# Patient Record
Sex: MALE | Race: BLACK OR AFRICAN AMERICAN | URBAN - METROPOLITAN AREA
[De-identification: names, ages, dates, MRNs, and addresses within clinical notes are randomized per-mention and may not be internally consistent; named-entity substitution may affect disease eponyms.]

---

## 2020-07-25 ENCOUNTER — TELEPHONE ENCOUNTER (OUTPATIENT)
Dept: URBAN - METROPOLITAN AREA CLINIC 13 | Facility: CLINIC | Age: 55
End: 2020-07-25

## 2020-07-26 ENCOUNTER — TELEPHONE ENCOUNTER (OUTPATIENT)
Dept: URBAN - METROPOLITAN AREA CLINIC 13 | Facility: CLINIC | Age: 55
End: 2020-07-26

## 2022-01-28 ENCOUNTER — TELEPHONE ENCOUNTER (OUTPATIENT)
Dept: URBAN - METROPOLITAN AREA CLINIC 72 | Facility: CLINIC | Age: 57
End: 2022-01-28

## 2022-02-09 ENCOUNTER — DASHBOARD ENCOUNTERS (OUTPATIENT)
Age: 57
End: 2022-02-09

## 2022-02-09 ENCOUNTER — OFFICE VISIT (OUTPATIENT)
Dept: URBAN - METROPOLITAN AREA CLINIC 107 | Facility: CLINIC | Age: 57
End: 2022-02-09
Payer: COMMERCIAL

## 2022-02-09 ENCOUNTER — WEB ENCOUNTER (OUTPATIENT)
Dept: URBAN - METROPOLITAN AREA CLINIC 107 | Facility: CLINIC | Age: 57
End: 2022-02-09

## 2022-02-09 VITALS
RESPIRATION RATE: 18 BRPM | BODY MASS INDEX: 35.25 KG/M2 | DIASTOLIC BLOOD PRESSURE: 100 MMHG | TEMPERATURE: 98.1 F | HEIGHT: 73 IN | HEART RATE: 81 BPM | WEIGHT: 266 LBS | SYSTOLIC BLOOD PRESSURE: 167 MMHG

## 2022-02-09 DIAGNOSIS — K92.1 HEMATOCHEZIA: ICD-10-CM

## 2022-02-09 DIAGNOSIS — D62 ACUTE BLOOD LOSS ANEMIA: ICD-10-CM

## 2022-02-09 DIAGNOSIS — Z86.010 HISTORY OF COLON POLYPS: ICD-10-CM

## 2022-02-09 PROCEDURE — 99204 OFFICE O/P NEW MOD 45 MIN: CPT | Performed by: INTERNAL MEDICINE

## 2022-02-09 PROCEDURE — 99244 OFF/OP CNSLTJ NEW/EST MOD 40: CPT | Performed by: INTERNAL MEDICINE

## 2022-02-09 RX ORDER — POLYETHYLENE GLYCOL 3350, SODIUM CHLORIDE, SODIUM BICARBONATE AND POTASSIUM CHLORIDE 420G
AS DIRECTED KIT ORAL ONCE
Qty: 420 GRAM | Refills: 0 | OUTPATIENT
Start: 2022-02-09 | End: 2022-02-10

## 2022-02-09 NOTE — HPI-TODAY'S VISIT:
56-year-old male with history of hypertension, hyperlipidemia, colon polyps, referred by Dr. Won Bey for evaluation of hematochezia.  A copy of this document is being forwarded to the referring provider. Most recent labs available for review from December 2020 show a hemoglobin of 10.3.  About two weeks ago, he felt generally unwell after arriving home from work. He reports the sudden onset of diffuse abdominal pressure and the urge to have a bowel movement, followed by the passage of a small amount of stool and a large volume of blood. His wife states he passed out during this event. She was able to get him to the bed and wrapped him in cool towels, because he was diffusely sweating. He felt severely nauseated and kept coughing up phelgm. Any time he would cough, he would pass a small amount of stool and red blood per rectum. This lasted several hours. The following day, he began having black stools. The abdominal pain subsided after a few days. He has not experienced further rectal bleeding since. He was seen by his PCP and states his hemoglobin was 8.6. This was rechecked a few days later and declined to 8.2; his hemoglobin was rechecked two days ago but he has not received the results. He has continued to feel lightheaded and dizzy, and has been unable to complete a full day of work. His wife started him on oral iron supplementation and B12. He has had two previous episodes of overt GI bleeding, in 2006 and 2019, both attributed to diverticular bleed. His last colonoscopy was over 5 years ago and notable for the removal of polyps. He also had an upper endoscopy at that time. He denies constipation at baseline.

## 2022-02-10 ENCOUNTER — TELEPHONE ENCOUNTER (OUTPATIENT)
Dept: URBAN - METROPOLITAN AREA CLINIC 107 | Facility: CLINIC | Age: 57
End: 2022-02-10

## 2022-02-10 ENCOUNTER — TELEPHONE ENCOUNTER (OUTPATIENT)
Dept: URBAN - METROPOLITAN AREA CLINIC 113 | Facility: CLINIC | Age: 57
End: 2022-02-10

## 2022-02-14 ENCOUNTER — TELEPHONE ENCOUNTER (OUTPATIENT)
Dept: URBAN - METROPOLITAN AREA CLINIC 113 | Facility: CLINIC | Age: 57
End: 2022-02-14

## 2022-04-01 ENCOUNTER — TELEPHONE ENCOUNTER (OUTPATIENT)
Dept: URBAN - METROPOLITAN AREA CLINIC 113 | Facility: CLINIC | Age: 57
End: 2022-04-01

## 2022-04-29 ENCOUNTER — OFFICE VISIT (OUTPATIENT)
Dept: URBAN - METROPOLITAN AREA SURGERY CENTER 25 | Facility: SURGERY CENTER | Age: 57
End: 2022-04-29

## 2022-11-30 ENCOUNTER — TELEPHONE ENCOUNTER (OUTPATIENT)
Dept: URBAN - METROPOLITAN AREA CLINIC 113 | Facility: CLINIC | Age: 57
End: 2022-11-30

## 2022-12-09 PROBLEM — 428283002 HISTORY OF POLYP OF COLON: Status: ACTIVE | Noted: 2022-02-09

## 2022-12-12 ENCOUNTER — OFFICE VISIT (OUTPATIENT)
Dept: URBAN - METROPOLITAN AREA SURGERY CENTER 25 | Facility: SURGERY CENTER | Age: 57
End: 2022-12-12
Payer: COMMERCIAL

## 2022-12-12 ENCOUNTER — TELEPHONE ENCOUNTER (OUTPATIENT)
Dept: URBAN - METROPOLITAN AREA CLINIC 113 | Facility: CLINIC | Age: 57
End: 2022-12-12

## 2022-12-12 DIAGNOSIS — K62.5 ANAL BLEEDING: ICD-10-CM

## 2022-12-12 DIAGNOSIS — D50.0 ANEMIA: ICD-10-CM

## 2022-12-12 PROCEDURE — 45378 DIAGNOSTIC COLONOSCOPY: CPT | Performed by: INTERNAL MEDICINE

## 2022-12-12 PROCEDURE — G8907 PT DOC NO EVENTS ON DISCHARG: HCPCS | Performed by: INTERNAL MEDICINE

## 2023-05-02 ENCOUNTER — TELEPHONE ENCOUNTER (OUTPATIENT)
Dept: URBAN - METROPOLITAN AREA CLINIC 113 | Facility: CLINIC | Age: 58
End: 2023-05-02

## 2024-07-11 ENCOUNTER — TELEPHONE ENCOUNTER (OUTPATIENT)
Dept: URBAN - METROPOLITAN AREA CLINIC 113 | Facility: CLINIC | Age: 59
End: 2024-07-11

## 2024-08-05 ENCOUNTER — OFFICE VISIT (OUTPATIENT)
Dept: URBAN - METROPOLITAN AREA CLINIC 107 | Facility: CLINIC | Age: 59
End: 2024-08-05

## 2024-08-26 ENCOUNTER — OFFICE VISIT (OUTPATIENT)
Dept: URBAN - METROPOLITAN AREA CLINIC 107 | Facility: CLINIC | Age: 59
End: 2024-08-26

## 2024-08-26 NOTE — HPI-TODAY'S VISIT:
59-year-old male with history of hypertension, hyperlipidemia, colon polyps, here for a hospital follow-up for mild diverticulitis and anemia.  Seen inpatient at [facility] from 7/8/2024 discharge 7/11/2024 with diverticulitis.  Started on IV Zosyn discharged on metronidazole and Cipro.  He did not require blood transfusion was initiated on iron therapy. CT abdomen and pelvis 7/8/2024 mild thickening and surrounding inflammatory changes of distal descending colon/proximal sigmoid colon suggestive of acute uncomplicated diverticulitis. Labs 7/11/2024.  Hemoglobin 7.7, hematocrit 24.9.  LFTs and BMP normal. Labs 7/30/2024.  CBC revealed hemoglobin of 8.9, RBC 4.4, hematocrit 28.4, MCV 65.4, platelet 444.  Vitamin B12 normal.  CMP normal.  Iron low at 14, iron saturation 3%.  Normal reticulocyte count.  Ferritin low at 9.  Colonoscopy by Dr. Baltazar 12/12/2022 revealed severe diverticulosis throughout.  Benefiber and MiraLAX recommended.    He also had an upper endoscopy several year ago .

## 2024-09-19 ENCOUNTER — LAB OUTSIDE AN ENCOUNTER (OUTPATIENT)
Dept: URBAN - METROPOLITAN AREA CLINIC 107 | Facility: CLINIC | Age: 59
End: 2024-09-19

## 2024-09-19 ENCOUNTER — OFFICE VISIT (OUTPATIENT)
Dept: URBAN - METROPOLITAN AREA CLINIC 107 | Facility: CLINIC | Age: 59
End: 2024-09-19
Payer: COMMERCIAL

## 2024-09-19 VITALS
BODY MASS INDEX: 35.04 KG/M2 | RESPIRATION RATE: 18 BRPM | DIASTOLIC BLOOD PRESSURE: 92 MMHG | HEART RATE: 88 BPM | TEMPERATURE: 97.9 F | OXYGEN SATURATION: 98 % | HEIGHT: 73 IN | SYSTOLIC BLOOD PRESSURE: 167 MMHG | WEIGHT: 264.4 LBS

## 2024-09-19 DIAGNOSIS — K57.30 COLON, DIVERTICULOSIS: ICD-10-CM

## 2024-09-19 DIAGNOSIS — R10.13 DYSPEPSIA: ICD-10-CM

## 2024-09-19 DIAGNOSIS — K62.5 BRIGHT RED RECTAL BLEEDING: ICD-10-CM

## 2024-09-19 DIAGNOSIS — R10.32 LEFT LOWER QUADRANT ABDOMINAL PAIN: ICD-10-CM

## 2024-09-19 PROBLEM — 87522002: Status: ACTIVE | Noted: 2024-09-19

## 2024-09-19 PROBLEM — 733657002: Status: ACTIVE | Noted: 2024-09-19

## 2024-09-19 PROCEDURE — 99214 OFFICE O/P EST MOD 30 MIN: CPT | Performed by: NURSE PRACTITIONER

## 2024-09-19 RX ORDER — SODIUM, POTASSIUM,MAG SULFATES 17.5-3.13G
AS DIRECTED SOLUTION, RECONSTITUTED, ORAL ORAL
Qty: 1 KIT | Refills: 0 | OUTPATIENT
Start: 2024-09-19

## 2024-09-19 RX ORDER — OMEPRAZOLE 40 MG/1
1 CAPSULE 30 MINUTES BEFORE MORNING MEAL CAPSULE, DELAYED RELEASE ORAL ONCE A DAY
Qty: 90 | Refills: 1 | OUTPATIENT
Start: 2024-09-19

## 2024-09-19 RX ORDER — FERROUS SULFATE 325(65) MG
1 TABLET TABLET ORAL DAILY
Status: ACTIVE | COMMUNITY

## 2024-09-19 NOTE — HPI-TODAY'S VISIT:
59-year-old male with history of hypertension, hyperlipidemia, colon polyps, here for a hospital follow-up for mild diverticulitis and anemia.  Seen inpatient at [facility] from 7/8/2024 discharge 7/11/2024 with diverticulitis.  Started on IV Zosyn discharged on metronidazole and Cipro.  He did not require blood transfusion was initiated on iron therapy. CT abdomen and pelvis 7/8/2024 mild thickening and surrounding inflammatory changes of distal descending colon/proximal sigmoid colon suggestive of acute uncomplicated diverticulitis. Labs 7/11/2024.  Hemoglobin 7.7, hematocrit 24.9.  LFTs and BMP normal. Labs 7/30/2024.  CBC revealed hemoglobin of 8.9, RBC 4.4, hematocrit 28.4, MCV 65.4, platelet 444.  Vitamin B12 normal.  CMP normal.  Iron low at 14, iron saturation 3%.  Normal reticulocyte count.  Ferritin low at 9.  Colonoscopy by Dr. Baltazar 12/12/2022 revealed severe diverticulosis throughout.  Benefiber and MiraLAX recommended. He also had an upper endoscopy several year ago.  I do not have the report.    He continues to have LLQ pain, unchanged from his hospitalization.  No fever, chills, diarrhea, constipation.  Oral iron daily was given a stool softener by Dr. Bey, but hasn't had to use it. Has nausea and indigestion.  Had bright red bleeding that sent him to the hospital in July.  Occasional on the wipe since then.

## 2024-10-07 ENCOUNTER — CLAIMS CREATED FROM THE CLAIM WINDOW (OUTPATIENT)
Dept: URBAN - METROPOLITAN AREA CLINIC 4 | Facility: CLINIC | Age: 59
End: 2024-10-07
Payer: COMMERCIAL

## 2024-10-07 ENCOUNTER — CLAIMS CREATED FROM THE CLAIM WINDOW (OUTPATIENT)
Dept: URBAN - METROPOLITAN AREA SURGERY CENTER 25 | Facility: SURGERY CENTER | Age: 59
End: 2024-10-07
Payer: COMMERCIAL

## 2024-10-07 DIAGNOSIS — K29.60 OTHER GASTRITIS WITHOUT BLEEDING: ICD-10-CM

## 2024-10-07 DIAGNOSIS — R10.13 ABDOMINAL DISCOMFORT, EPIGASTRIC: ICD-10-CM

## 2024-10-07 DIAGNOSIS — R10.13 EPIGASTRIC PAIN: ICD-10-CM

## 2024-10-07 DIAGNOSIS — K31.A11 GASTRIC INTESTINAL METAPLASIA WITHOUT DYSPLASIA, INVOLVING THE ANTRUM: ICD-10-CM

## 2024-10-07 DIAGNOSIS — K31.89 ACHYLIA: ICD-10-CM

## 2024-10-07 PROCEDURE — 88342 IMHCHEM/IMCYTCHM 1ST ANTB: CPT | Performed by: PATHOLOGY

## 2024-10-07 PROCEDURE — 43239 EGD BIOPSY SINGLE/MULTIPLE: CPT | Performed by: INTERNAL MEDICINE

## 2024-10-07 PROCEDURE — 00731 ANES UPR GI NDSC PX NOS: CPT | Performed by: NURSE ANESTHETIST, CERTIFIED REGISTERED

## 2024-10-07 PROCEDURE — 88305 TISSUE EXAM BY PATHOLOGIST: CPT | Performed by: PATHOLOGY

## 2024-10-07 RX ORDER — OMEPRAZOLE 40 MG/1
1 CAPSULE 30 MINUTES BEFORE MORNING MEAL CAPSULE, DELAYED RELEASE ORAL ONCE A DAY
Qty: 90 | Refills: 1 | Status: ACTIVE | COMMUNITY
Start: 2024-09-19

## 2024-10-07 RX ORDER — SODIUM, POTASSIUM,MAG SULFATES 17.5-3.13G
AS DIRECTED SOLUTION, RECONSTITUTED, ORAL ORAL
Qty: 1 KIT | Refills: 0 | Status: ACTIVE | COMMUNITY
Start: 2024-09-19

## 2024-10-07 RX ORDER — FERROUS SULFATE 325(65) MG
1 TABLET TABLET ORAL DAILY
Status: ACTIVE | COMMUNITY

## 2024-10-21 ENCOUNTER — CLAIMS CREATED FROM THE CLAIM WINDOW (OUTPATIENT)
Dept: URBAN - METROPOLITAN AREA SURGERY CENTER 25 | Facility: SURGERY CENTER | Age: 59
End: 2024-10-21
Payer: COMMERCIAL

## 2024-10-21 DIAGNOSIS — K64.0 FIRST DEGREE HEMORRHOIDS: ICD-10-CM

## 2024-10-21 DIAGNOSIS — K92.1 MELENA: ICD-10-CM

## 2024-10-21 DIAGNOSIS — K57.32 DIVERTICULITIS LARGE INTESTINE W/O PERFORATION OR ABSCESS W/O BLEEDING: ICD-10-CM

## 2024-10-21 DIAGNOSIS — K92.1 HEMATOCHEZIA: ICD-10-CM

## 2024-10-21 DIAGNOSIS — K57.30 COLON, DIVERTICULOSIS: ICD-10-CM

## 2024-10-21 PROCEDURE — 45378 DIAGNOSTIC COLONOSCOPY: CPT | Performed by: INTERNAL MEDICINE

## 2024-10-21 PROCEDURE — 00811 ANES LWR INTST NDSC NOS: CPT | Performed by: NURSE ANESTHETIST, CERTIFIED REGISTERED

## 2024-10-21 PROCEDURE — 00811 ANES LWR INTST NDSC NOS: CPT | Performed by: ANESTHESIOLOGY

## 2024-10-21 RX ORDER — OMEPRAZOLE 40 MG/1
1 CAPSULE 30 MINUTES BEFORE MORNING MEAL CAPSULE, DELAYED RELEASE ORAL ONCE A DAY
Qty: 90 | Refills: 1 | Status: ACTIVE | COMMUNITY
Start: 2024-09-19

## 2024-10-21 RX ORDER — FERROUS SULFATE 325(65) MG
1 TABLET TABLET ORAL DAILY
Status: ACTIVE | COMMUNITY

## 2024-10-21 RX ORDER — SODIUM, POTASSIUM,MAG SULFATES 17.5-3.13G
AS DIRECTED SOLUTION, RECONSTITUTED, ORAL ORAL
Qty: 1 KIT | Refills: 0 | Status: ACTIVE | COMMUNITY
Start: 2024-09-19